# Patient Record
(demographics unavailable — no encounter records)

---

## 2025-07-02 NOTE — WORK
[Sprain/Strain] : sprain/strain [Was the competent medical cause of the injury] : was the competent medical cause of the injury [Are consistent with the injury] : are consistent with the injury [Consistent with my objective findings] : consistent with my objective findings [I actively supervised the healthcare provider named who provided these services] : I actively supervised the healthcare provider named who provided these services:

## 2025-07-04 NOTE — PHYSICAL EXAM
[de-identified] : Neurologic: normal mood and affect, orientated and able to communicate Constitutional: well developed and well nourished  Left Knee: medial joint line tenderness +Medial True's +Locking Full ROM Pain with full extension Medial buckling

## 2025-07-04 NOTE — ADDENDUM
[FreeTextEntry1] : Documented by Mickey Lux acting as a scribe for Dr. Ga and Sebastian Paul PA-C on 07/02/2025 and was presence for the following sections: Physical Exam; Data Reviewed; Assessment; Discussion/Summary. All medical record entries made by the Scribe were at my, Dr. Ga, and Sebastian Paul, direction and personally dictated by me on 07/02/2025. I have reviewed the chart and agree that the record accurately reflects my personal performance of the history, physical exam, procedure and imaging.

## 2025-07-04 NOTE — DISCUSSION/SUMMARY
[de-identified] : Reviewed all X Ray images with patient today and interpretation was provided. MRI of left knee to mady [facility]. Advised patient to wear Playmaker knee brace, brace provided during today's visit. Advised patient to wear Reparel knee sleeve, informational card provided. Work notes provided to patient. Patient will follow up after MRI.  Patient has had no injuries in the past and has been out of work since Friday. He was prepping and painting a hard surface on his knees at work.  Due to this patient expressing significant pain, I am prescribing an iceless cold/heat compression therapy device for at home use to be used 3-5 times per day at 40 degrees for 35 days. I would like my patient to begin with simultaneous cold & compression therapy at 10mm pressure. At the patients follow up I will determine whether they should continue with cold, or if they should transition to contrast cold/heat compression therapy. Unlike a conventional cold therapy unit that requires ice, the ThermX iceless device is set to a prescribed temperature that it will remain throughout the entire duration of use, whether that be cold compression, heat compression, or contrast compression. Cold therapy units that depend on ice melt over a very short period and do not provide compression which limits the compliance and effectiveness for pain/inflammation reduction that I am targeting for my patient. I have reached out to [email or web address] to supply this device as they are the exclusive provider of the ThermX and the patient will be contacted and instructed on how to utilize the device.    Due to this patient expressing significant pain, I am prescribing an iceless cold/heat compression therapy device for at home use to be used 3-5 times per day at 40 degrees for 35 days. I would like my patient to begin with simultaneous cold & compression therapy at 10mm pressure. At the patients follow up I will determine whether they should continue with cold, or if they should transition to contrast cold/heat compression therapy. Unlike a conventional cold therapy unit that requires ice, the ThermX iceless device is set to a prescribed temperature that it will remain throughout the entire duration of use, whether that be cold compression, heat compression, or contrast compression. Cold therapy units that depend on ice melt over a very short period and do not provide compression which limits the compliance and effectiveness for pain/inflammation reduction that I am targeting for my patient. I have reached out to [email or web address] to supply this device as they are the exclusive provider of the ThermX and the patient will be contacted and instructed on how to utilize the device.     ----------------------------------------------- Home Exercise The patient is instructed on a home exercise program.   EDGARDO RENE Acting as a Scribe for Dr. Harmeet POSADA, Edgardo Rene, attest that this documentation has been prepared under the direction and in the presence of Provider Dr. Ga.   Activity Modification The patient was advised to modify their activities.   Dx / Natural History The patient was advised of the diagnosis. The natural history of the pathology was explained in full to the patient in layman's terms. Several different treatment options were discussed and explained in full to the patient including the risks and benefits of both surgical and non-surgical treatments.  All questions and concerns were answered.   Pain Guide Activities The patient was advised to let pain guide the gradual advancement of activities.   RICE I explained to the patient that rest, ice, compression, and elevation would benefit them. They may return to activity after follow-up or when they no longer have any pain.   The patient's current medication management of their orthopedic diagnosis was reviewed today: (1) We discussed a comprehensive treatment plan that included possible pharmaceutical management involving the use of prescription strength medications including but not limited to options such as oral Naprosyn 500mg BID, once daily Meloxicam 15 mg, or 500-650 mg Tylenol versus over the counter oral medications and topical prescription NSAID Pennsaid vs over the counter Voltaren gel. (2) There is a moderate risk of morbidity with further treatment, especially from use of prescription strength medications and possible side effects of these medications which include upset stomach with oral medications, skin reactions to topical medications and cardiac/renal issues with long term use. (3) I recommended that the patient follow-up with their medical physician to discuss any significant specific potential issues with long term medication use such as interactions with current medications or with exacerbation of underlying medical comorbidities. (4) The benefits and risks associated with use of injectable, oral or topical, prescription and over the counter anti-inflammatory medications were discussed with the patient. The patient voiced understanding of the risks including but not limited to bleeding, stroke, kidney dysfunction, heart disease, and were referred to the black box warning label for further information.

## 2025-07-04 NOTE — HISTORY OF PRESENT ILLNESS
[de-identified] : The patient is a 52 year old [RIGHT] hand dominant male who presents today complaining of Left knee pain Date of Injury/Onset:  DOI 6/21/2025 Pain:    At Rest: 7/10  With Activity:  8/10  Mechanism of injury: prepping floor with tape, then painting on B/L knees, wearing knee pads.  Unable to get up on his own.  Pt taking Motrin PRN. This is a Work Related Injury being treated under Worker's Compensation. This is NOT an athletic injury occurring associated with an interscholastic or organized sports team. Quality of symptoms: throbbing Improves with: nothing  Worse with: walking, bearing weight Prior treatment: DONNA  [facility] Prior Imaging: xray @ [facility] Out of work: 6/30/2025 School/Sport/Position/Occupation:  Additional Information: patient can hear "popping noise" when ambulating.  Patient was given a knee brace but only wearing it at home.

## 2025-07-04 NOTE — DATA REVIEWED
[FreeTextEntry1] : 7/2/25 [facility] X-RAY Right Knee 4 views: This scan was reviewed and interpreted by Dr. Ga, and his findings are- severe patella jadiel, grade 2 medial OA, varus alignment

## 2025-07-04 NOTE — PHYSICAL EXAM
[de-identified] : Neurologic: normal mood and affect, orientated and able to communicate Constitutional: well developed and well nourished  Left Knee: medial joint line tenderness +Medial True's +Locking Full ROM Pain with full extension Medial buckling

## 2025-07-04 NOTE — DISCUSSION/SUMMARY
[de-identified] : Reviewed all X Ray images with patient today and interpretation was provided. MRI of left knee to mady [facility]. Advised patient to wear Playmaker knee brace, brace provided during today's visit. Advised patient to wear Reparel knee sleeve, informational card provided. Work notes provided to patient. Patient will follow up after MRI.  Patient has had no injuries in the past and has been out of work since Friday. He was prepping and painting a hard surface on his knees at work.  Due to this patient expressing significant pain, I am prescribing an iceless cold/heat compression therapy device for at home use to be used 3-5 times per day at 40 degrees for 35 days. I would like my patient to begin with simultaneous cold & compression therapy at 10mm pressure. At the patients follow up I will determine whether they should continue with cold, or if they should transition to contrast cold/heat compression therapy. Unlike a conventional cold therapy unit that requires ice, the ThermX iceless device is set to a prescribed temperature that it will remain throughout the entire duration of use, whether that be cold compression, heat compression, or contrast compression. Cold therapy units that depend on ice melt over a very short period and do not provide compression which limits the compliance and effectiveness for pain/inflammation reduction that I am targeting for my patient. I have reached out to [facility] to supply this device as they are the exclusive provider of the ThermX and the patient will be contacted and instructed on how to utilize the device.    Due to this patient expressing significant pain, I am prescribing an iceless cold/heat compression therapy device for at home use to be used 3-5 times per day at 40 degrees for 35 days. I would like my patient to begin with simultaneous cold & compression therapy at 10mm pressure. At the patients follow up I will determine whether they should continue with cold, or if they should transition to contrast cold/heat compression therapy. Unlike a conventional cold therapy unit that requires ice, the ThermX iceless device is set to a prescribed temperature that it will remain throughout the entire duration of use, whether that be cold compression, heat compression, or contrast compression. Cold therapy units that depend on ice melt over a very short period and do not provide compression which limits the compliance and effectiveness for pain/inflammation reduction that I am targeting for my patient. I have reached out to [facility] to supply this device as they are the exclusive provider of the ThermX and the patient will be contacted and instructed on how to utilize the device.     ----------------------------------------------- Home Exercise The patient is instructed on a home exercise program.   EDGARDO RENE Acting as a Scribe for Dr. Harmeet POSADA, Edgardo Rene, attest that this documentation has been prepared under the direction and in the presence of Provider Dr. Ga.   Activity Modification The patient was advised to modify their activities.   Dx / Natural History The patient was advised of the diagnosis. The natural history of the pathology was explained in full to the patient in layman's terms. Several different treatment options were discussed and explained in full to the patient including the risks and benefits of both surgical and non-surgical treatments.  All questions and concerns were answered.   Pain Guide Activities The patient was advised to let pain guide the gradual advancement of activities.   RICE I explained to the patient that rest, ice, compression, and elevation would benefit them. They may return to activity after follow-up or when they no longer have any pain.   The patient's current medication management of their orthopedic diagnosis was reviewed today: (1) We discussed a comprehensive treatment plan that included possible pharmaceutical management involving the use of prescription strength medications including but not limited to options such as oral Naprosyn 500mg BID, once daily Meloxicam 15 mg, or 500-650 mg Tylenol versus over the counter oral medications and topical prescription NSAID Pennsaid vs over the counter Voltaren gel. (2) There is a moderate risk of morbidity with further treatment, especially from use of prescription strength medications and possible side effects of these medications which include upset stomach with oral medications, skin reactions to topical medications and cardiac/renal issues with long term use. (3) I recommended that the patient follow-up with their medical physician to discuss any significant specific potential issues with long term medication use such as interactions with current medications or with exacerbation of underlying medical comorbidities. (4) The benefits and risks associated with use of injectable, oral or topical, prescription and over the counter anti-inflammatory medications were discussed with the patient. The patient voiced understanding of the risks including but not limited to bleeding, stroke, kidney dysfunction, heart disease, and were referred to the black box warning label for further information.

## 2025-07-04 NOTE — HISTORY OF PRESENT ILLNESS
[de-identified] : The patient is a 52 year old [RIGHT] hand dominant male who presents today complaining of Left knee pain Date of Injury/Onset:  DOI 6/21/2025 Pain:    At Rest: 7/10  With Activity:  8/10  Mechanism of injury: prepping floor with tape, then painting on B/L knees, wearing knee pads.  Unable to get up on his own.  Pt taking Motrin PRN. This is a Work Related Injury being treated under Worker's Compensation. This is NOT an athletic injury occurring associated with an interscholastic or organized sports team. Quality of symptoms: throbbing Improves with: nothing  Worse with: walking, bearing weight Prior treatment: DONNA  [facility] Prior Imaging: xray @ [facility] Out of work: 6/30/2025 School/Sport/Position/Occupation:  Additional Information: patient can hear "popping noise" when ambulating.  Patient was given a knee brace but only wearing it at home.

## 2025-07-04 NOTE — ADDENDUM
[FreeTextEntry1] : Documented by Mickey Lux acting as a scribe for Dr. Ga and Sebastian Paul PA-C on 07/02/2025 and was presence for the following sections: Physical Exam; Data Reviewed; Assessment; Discussion/Summary. All medical record entries made by the Scribe were at my, Dr. Ga, and Sebastian Paul, direction and personally dictated by me on 07/02/2025. I have reviewed the chart and agree that the record accurately reflects my personal performance of the history, physical exam, procedure and imaging. Dental Pain    Dental pain (toothache) may be caused by many things including tooth decay (cavities or caries), abscess or infection, or trauma. If you were prescribed an antibiotic medicine, finish all of it even if you start to feel better. Rinsing your mouth with salt water or applying ice to the painful area of your face may help with the pain. Follow up with a dentist is important in ensuring good oral health and preventing the worsening of dental disease.    SEEK IMMEDIATE MEDICAL CARE IF YOU HAVE ANY OF THE FOLLOWING SYMPTOMS: unable to open your mouth, trouble breathing or swallowing, fever, or swelling of the face, neck, or jaw.    You visited the Emergency Department for a dental issue. We recommend that you follow up with a dentist. If you have a private dentist, please contact them directly. If you do not have a dentist, we have a dental clinic that will do its best to accommodate you. To make an appointment, please call the dental clinic at [phone] and leave a voicemail or email them at [email or web address] for a response.

## 2025-07-12 NOTE — PHYSICAL EXAM
[de-identified] : Neurologic: normal mood and affect, orientated and able to communicate Constitutional: well developed and well nourished  Left Knee: medial joint line tenderness +Medial True's +Locking Full ROM Pain with full extension Medial buckling

## 2025-07-12 NOTE — DATA REVIEWED
[FreeTextEntry1] : 7/2/25 [facility] X-RAY Right Knee 4 views: This scan was reviewed and interpreted by Dr. Ga, and his findings are- severe patella jadiel, grade 2 medial OA, varus alignment  07/09/25 [facility] MRI Left Knee: 1. Medial meniscus horizontal tear posterior horn extending to the inferior articular surface with 12 mm septated meniscal cyst posteromedial joint line. Tear extends into the body towards the capsular margin. 2. Joint effusion. 3. Focal marrow edema medial margin medial tibia, which may be due to cartilage loss not visualized. Contusion could mimic this finding.

## 2025-07-12 NOTE — HISTORY OF PRESENT ILLNESS
[de-identified] : 7/11/2025 MRI results, left knee wearing knee brace at home, pt wants to discuss returning to work.   No PT  The patient is a 52 year old [RIGHT] hand dominant male who presents today complaining of Left knee pain Date of Injury/Onset:  DOI 6/21/2025 Pain:    At Rest: 7/10  With Activity:  8/10  Mechanism of injury: prepping floor with tape, then painting on B/L knees, wearing knee pads.  Unable to get up on his own.  Pt taking Motrin PRN. This is a Work Related Injury being treated under Worker's Compensation. This is NOT an athletic injury occurring associated with an interscholastic or organized sports team. Quality of symptoms: throbbing Improves with: nothing  Worse with: walking, bearing weight Prior treatment: DONNA Osborne Prior Imaging: xray @ [facility] Out of work: 6/30/2025 School/Sport/Position/Occupation:  Additional Information: patient can hear "popping noise" when ambulating.  Patient was given a knee brace but only wearing it at home.

## 2025-07-12 NOTE — HISTORY OF PRESENT ILLNESS
[de-identified] : 7/11/2025 MRI results, left knee wearing knee brace at home, pt wants to discuss returning to work.   No PT  The patient is a 52 year old [RIGHT] hand dominant male who presents today complaining of Left knee pain Date of Injury/Onset:  DOI 6/21/2025 Pain:    At Rest: 7/10  With Activity:  8/10  Mechanism of injury: prepping floor with tape, then painting on B/L knees, wearing knee pads.  Unable to get up on his own.  Pt taking Motrin PRN. This is a Work Related Injury being treated under Worker's Compensation. This is NOT an athletic injury occurring associated with an interscholastic or organized sports team. Quality of symptoms: throbbing Improves with: nothing  Worse with: walking, bearing weight Prior treatment: DONNA Osborne Prior Imaging: xray @ [facility] Out of work: 6/30/2025 School/Sport/Position/Occupation:  Additional Information: patient can hear "popping noise" when ambulating.  Patient was given a knee brace but only wearing it at home.

## 2025-07-12 NOTE — DISCUSSION/SUMMARY
[de-identified] : Reviewed all X Ray images with patient today and interpretation was provided. Physical therapy prescribed for strengthening and stretching. Discussed all treatment option with patient, would like to follow through with csi injection today. Patient will follow up as needed.   **cleared for work, notes provided.      RB&A to corticosteroid injection discussed. All questions were answered. Patient wishes to move forward with injection today.   Left Knee Ultrasound Guided aspiration/steroid injection procedure note: Patient Identification Name/: Verbal with patient and/or family   Procedure Verification: Procedure confirmed with patient or family/designee Consent for procedure: Verbal Consent Given Relevant documentation completed, reviewed, and signed Clinical indications for procedure confirmed  Time-out with all members of procedure team immediately prior to procedure: Correct patient identified. Agreement on procedure. Correct side and site.  KNEE INTRAARTICULAR INJECTION - LEFT After verbal consent and identification of the correct patient and correct site, the LEFT superolateral knee was prepped using alcohol swabs and betadine. This was allowed time to air dry.  A mixture of Kenalog,  Bupicacaine  was injected into the left knee using a sterile 22G 1.5 inch needle.  The patient tolerated the procedure well.  A sterile dressing was placed.  After-care instructions were provided and included instructions to ice the area and to call if redness, pain, or fever develop.    ----------------------------------------------- Home Exercise The patient is instructed on a home exercise program.  JOSH CHANDLER Acting as a Scribe for Dr. Harmeet POSADA, Josh Chandler, attest that this documentation has been prepared under the direction and in the presence of Provider Dewayne Ga MD.  Activity Modification The patient was advised to modify their activities.  Dx / Natural History The patient was advised of the diagnosis.  The natural history of the pathology was explained in full to the patient in layman's terms.  Several different treatment options were discussed and explained in full to the patient including the risks and benefits of both surgical and non-surgical treatments.  All questions and concerns were answered.  Pain Guide Activities The patient was advised to let pain guide the gradual advancement of activities.  JANAY POSADA explained to the patient that rest, ice, compression, and elevation would benefit them.  They may return to activity after follow-up or when they no longer have any pain.  The patient's current medication management of their orthopedic diagnosis was reviewed today: (1) We discussed a comprehensive treatment plan that included possible pharmaceutical management involving the use of prescription strength medications including but not limited to options such as oral Naprosyn 500mg BID, once daily Meloxicam 15 mg, or 500-650 mg Tylenol versus over the counter oral medications and topical prescription NSAID Pennsaid vs over the counter Voltaren gel. (2) There is a moderate risk of morbidity with further treatment, especially from use of prescription strength medications and possible side effects of these medications which include upset stomach with oral medications, skin reactions to topical medications and cardiac/renal issues with long term use. (3) I recommended that the patient follow-up with their medical physician to discuss any significant specific potential issues with long term medication use such as interactions with current medications or with exacerbation of underlying medical comorbidities. (4) The benefits and risks associated with use of injectable, oral or topical, prescription and over the counter anti-inflammatory medications were discussed with the patient. The patient voiced understanding of the risks including but not limited to bleeding, stroke, kidney dysfunction, heart disease, and were referred to the black box warning label for further information.

## 2025-07-12 NOTE — PHYSICAL EXAM
[de-identified] : Neurologic: normal mood and affect, orientated and able to communicate Constitutional: well developed and well nourished  Left Knee: medial joint line tenderness +Medial True's +Locking Full ROM Pain with full extension Medial buckling

## 2025-07-12 NOTE — DISCUSSION/SUMMARY
[de-identified] : Reviewed all X Ray images with patient today and interpretation was provided. Physical therapy prescribed for strengthening and stretching. Discussed all treatment option with patient, would like to follow through with csi injection today. Patient will follow up as needed.   **cleared for work, notes provided.      RB&A to corticosteroid injection discussed. All questions were answered. Patient wishes to move forward with injection today.   Left Knee Ultrasound Guided aspiration/steroid injection procedure note: Patient Identification Name/: Verbal with patient and/or family   Procedure Verification: Procedure confirmed with patient or family/designee Consent for procedure: Verbal Consent Given Relevant documentation completed, reviewed, and signed Clinical indications for procedure confirmed  Time-out with all members of procedure team immediately prior to procedure: Correct patient identified. Agreement on procedure. Correct side and site.  KNEE INTRAARTICULAR INJECTION - LEFT After verbal consent and identification of the correct patient and correct site, the LEFT superolateral knee was prepped using alcohol swabs and betadine. This was allowed time to air dry.  A mixture of Kenalog,  Bupicacaine  was injected into the left knee using a sterile 22G 1.5 inch needle.  The patient tolerated the procedure well.  A sterile dressing was placed.  After-care instructions were provided and included instructions to ice the area and to call if redness, pain, or fever develop.    ----------------------------------------------- Home Exercise The patient is instructed on a home exercise program.  JOSH CHANDLER Acting as a Scribe for Dr. Harmeet POSADA, Josh Chandler, attest that this documentation has been prepared under the direction and in the presence of Provider Dewayne Ga MD.  Activity Modification The patient was advised to modify their activities.  Dx / Natural History The patient was advised of the diagnosis.  The natural history of the pathology was explained in full to the patient in layman's terms.  Several different treatment options were discussed and explained in full to the patient including the risks and benefits of both surgical and non-surgical treatments.  All questions and concerns were answered.  Pain Guide Activities The patient was advised to let pain guide the gradual advancement of activities.  JANAY POSADA explained to the patient that rest, ice, compression, and elevation would benefit them.  They may return to activity after follow-up or when they no longer have any pain.  The patient's current medication management of their orthopedic diagnosis was reviewed today: (1) We discussed a comprehensive treatment plan that included possible pharmaceutical management involving the use of prescription strength medications including but not limited to options such as oral Naprosyn 500mg BID, once daily Meloxicam 15 mg, or 500-650 mg Tylenol versus over the counter oral medications and topical prescription NSAID Pennsaid vs over the counter Voltaren gel. (2) There is a moderate risk of morbidity with further treatment, especially from use of prescription strength medications and possible side effects of these medications which include upset stomach with oral medications, skin reactions to topical medications and cardiac/renal issues with long term use. (3) I recommended that the patient follow-up with their medical physician to discuss any significant specific potential issues with long term medication use such as interactions with current medications or with exacerbation of underlying medical comorbidities. (4) The benefits and risks associated with use of injectable, oral or topical, prescription and over the counter anti-inflammatory medications were discussed with the patient. The patient voiced understanding of the risks including but not limited to bleeding, stroke, kidney dysfunction, heart disease, and were referred to the black box warning label for further information.